# Patient Record
Sex: MALE | URBAN - METROPOLITAN AREA
[De-identification: names, ages, dates, MRNs, and addresses within clinical notes are randomized per-mention and may not be internally consistent; named-entity substitution may affect disease eponyms.]

---

## 2021-09-24 ENCOUNTER — OUT OF OFFICE VISIT (OUTPATIENT)
Dept: URBAN - METROPOLITAN AREA MEDICAL CENTER 33 | Facility: MEDICAL CENTER | Age: 26
End: 2021-09-24
Payer: COMMERCIAL

## 2021-09-24 DIAGNOSIS — R11.0 AM NAUSEA: ICD-10-CM

## 2021-09-24 DIAGNOSIS — R10.84 ABDOMINAL CRAMPING, GENERALIZED: ICD-10-CM

## 2021-09-24 DIAGNOSIS — D64.89 ANEMIA DUE TO OTHER CAUSE: ICD-10-CM

## 2021-09-24 DIAGNOSIS — K50.90 ABDOMINAL PAIN DESPITE THERAPY FOR CROHN'S DISEASE: ICD-10-CM

## 2021-09-24 PROCEDURE — 99255 IP/OBS CONSLTJ NEW/EST HI 80: CPT | Performed by: INTERNAL MEDICINE

## 2021-09-25 ENCOUNTER — OUT OF OFFICE VISIT (OUTPATIENT)
Dept: URBAN - METROPOLITAN AREA MEDICAL CENTER 33 | Facility: MEDICAL CENTER | Age: 26
End: 2021-09-25
Payer: COMMERCIAL

## 2021-09-25 DIAGNOSIS — R10.84 ABDOMINAL CRAMPING, GENERALIZED: ICD-10-CM

## 2021-09-25 DIAGNOSIS — K50.918 ACUTE CROHN'S DISEASE WITH OTHER COMPLICATION: ICD-10-CM

## 2021-09-25 PROCEDURE — 99232 SBSQ HOSP IP/OBS MODERATE 35: CPT | Performed by: INTERNAL MEDICINE

## 2021-09-27 ENCOUNTER — OUT OF OFFICE VISIT (OUTPATIENT)
Dept: URBAN - METROPOLITAN AREA MEDICAL CENTER 33 | Facility: MEDICAL CENTER | Age: 26
End: 2021-09-27
Payer: COMMERCIAL

## 2021-09-27 DIAGNOSIS — R11.0 AM NAUSEA: ICD-10-CM

## 2021-09-27 DIAGNOSIS — D64.89 ANEMIA DUE TO OTHER CAUSE: ICD-10-CM

## 2021-09-27 DIAGNOSIS — K50.10 CC (CROHN'S COLITIS): ICD-10-CM

## 2021-09-27 DIAGNOSIS — R10.84 ABDOMINAL CRAMPING, GENERALIZED: ICD-10-CM

## 2021-09-27 PROCEDURE — 99232 SBSQ HOSP IP/OBS MODERATE 35: CPT | Performed by: INTERNAL MEDICINE

## 2021-10-12 ENCOUNTER — OFFICE VISIT (OUTPATIENT)
Dept: URBAN - METROPOLITAN AREA CLINIC 17 | Facility: CLINIC | Age: 26
End: 2021-10-12
Payer: COMMERCIAL

## 2021-10-12 VITALS
TEMPERATURE: 97.4 F | DIASTOLIC BLOOD PRESSURE: 68 MMHG | HEART RATE: 80 BPM | WEIGHT: 190.2 LBS | BODY MASS INDEX: 22.46 KG/M2 | HEIGHT: 77 IN | SYSTOLIC BLOOD PRESSURE: 108 MMHG

## 2021-10-12 DIAGNOSIS — K56.609 SBO (SMALL BOWEL OBSTRUCTION): ICD-10-CM

## 2021-10-12 DIAGNOSIS — F12.90 MARIJUANA USE: ICD-10-CM

## 2021-10-12 DIAGNOSIS — K50.812 CROHN'S DISEASE OF BOTH SMALL AND LARGE INTESTINE WITH INTESTINAL OBSTRUCTION: ICD-10-CM

## 2021-10-12 PROCEDURE — 99214 OFFICE O/P EST MOD 30 MIN: CPT | Performed by: INTERNAL MEDICINE

## 2021-10-12 RX ORDER — PREDNISONE 20 MG/1
1 TABLET TABLET ORAL ONCE A DAY
Status: ACTIVE | COMMUNITY

## 2021-10-12 NOTE — HPI-TODAY'S VISIT:
10/12/21  25 yo male I met at [facility] last month for flare of Crohns disease.   Richard Solano is a 26 y.o. male on whom I have been asked to consult for evaluation and treatment of crohn's flare. PMH of Crohn's disease diagnosed on 2017 in [place]. Present to [facility] with abdominal pain, nausea and decreased ability to have a BM. Labs fairly normal but CT with wall thickening and enhancement in the terminal ileum and cecum likely causing an SBO. Inflammation and edema in the surrounding mesentery.   Recently moved to [place] about 2 month ago and is currently staying with his sister. He report he was diagnosed 4 years ago in  [place] with crohn's disease. He reports seeing a GI doctor there that was going to start him on a biologic but when the risk of CA was discussed he became worried. He reports at least 1-2 flares per month that he has tried to treate at home with diet modification or high diose cannabinoid oil. He report his symptoms usually present wtih RLQ/ low abdominal abdominal pain that is crampy and constant. He will usually stop having BMs and then become nauseated and vomit a couple time. If this does not improve after a couple days he will usually see a MD (urgent care) and will be placed on a steroid taper.  He was on a steroid taper when his symptoms recurred about 3 months ago. He was given Prednisone 40mg and weaned to 30mg last week. On Tuesday he started to have abdominal pain and vomited once. His pain increased significantly Wednesday with bloating and " bulging" and he vomited 2x so he came to the ED. He reports he currently does not have health insurance but has submitted a medicaid application. He also notes a weight loss of about 100lbs since he was first diagnosed. His mother has RA and his sister was recently diagnosed with Crohns disease.   10/12/21 "Ruma been doing really good since getting out of the hospital. He has gone from 40 mg of prednisone to 30 mg of prednisone today,  No vomiting, mild nausea "not that much", No abdominal pain. Has regular BMs. no records from [place]  He has submitted a medicaid application.

## 2021-11-03 ENCOUNTER — OUT OF OFFICE VISIT (OUTPATIENT)
Dept: URBAN - METROPOLITAN AREA MEDICAL CENTER 33 | Facility: MEDICAL CENTER | Age: 26
End: 2021-11-03
Payer: COMMERCIAL

## 2021-11-03 DIAGNOSIS — R19.7 ACUTE DIARRHEA: ICD-10-CM

## 2021-11-03 DIAGNOSIS — K56.7 ILEUS: ICD-10-CM

## 2021-11-03 DIAGNOSIS — K50.918 ACUTE CROHN'S DISEASE WITH OTHER COMPLICATION: ICD-10-CM

## 2021-11-03 PROCEDURE — 99255 IP/OBS CONSLTJ NEW/EST HI 80: CPT | Performed by: INTERNAL MEDICINE

## 2021-11-04 ENCOUNTER — OUT OF OFFICE VISIT (OUTPATIENT)
Dept: URBAN - METROPOLITAN AREA MEDICAL CENTER 33 | Facility: MEDICAL CENTER | Age: 26
End: 2021-11-04
Payer: COMMERCIAL

## 2021-11-04 DIAGNOSIS — R19.7 ACUTE DIARRHEA: ICD-10-CM

## 2021-11-04 DIAGNOSIS — K50.90 ABDOMINAL PAIN DESPITE THERAPY FOR CROHN'S DISEASE: ICD-10-CM

## 2021-11-04 PROCEDURE — 99232 SBSQ HOSP IP/OBS MODERATE 35: CPT | Performed by: INTERNAL MEDICINE

## 2021-11-16 ENCOUNTER — OFFICE VISIT (OUTPATIENT)
Dept: URBAN - METROPOLITAN AREA CLINIC 17 | Facility: CLINIC | Age: 26
End: 2021-11-16
Payer: COMMERCIAL

## 2021-11-16 VITALS
TEMPERATURE: 97.4 F | WEIGHT: 198 LBS | BODY MASS INDEX: 23.38 KG/M2 | HEART RATE: 86 BPM | DIASTOLIC BLOOD PRESSURE: 68 MMHG | SYSTOLIC BLOOD PRESSURE: 109 MMHG | HEIGHT: 77 IN

## 2021-11-16 DIAGNOSIS — K56.690 OTHER PARTIAL INTESTINAL OBSTRUCTION: ICD-10-CM

## 2021-11-16 DIAGNOSIS — D64.89 ANEMIA DUE TO OTHER CAUSE: ICD-10-CM

## 2021-11-16 DIAGNOSIS — K50.812 CROHN'S DISEASE OF BOTH SMALL AND LARGE INTESTINE WITH INTESTINAL OBSTRUCTION: ICD-10-CM

## 2021-11-16 DIAGNOSIS — F12.90 MARIJUANA USE: ICD-10-CM

## 2021-11-16 PROCEDURE — 99214 OFFICE O/P EST MOD 30 MIN: CPT | Performed by: INTERNAL MEDICINE

## 2021-11-16 RX ORDER — OMEPRAZOLE 40 MG/1
1 CAPSULE 30 MINUTES BEFORE MORNING MEAL CAPSULE, DELAYED RELEASE ORAL ONCE A DAY
Status: ACTIVE | COMMUNITY

## 2021-11-16 RX ORDER — PREDNISONE 20 MG/1
2 TABLETS WITH FOOD OR MILK TABLET ORAL ONCE A DAY
Qty: 60 TABLET | OUTPATIENT
Start: 2021-11-16 | End: 2021-12-16

## 2021-11-16 RX ORDER — PREDNISONE 20 MG/1
1 TABLET TABLET ORAL ONCE A DAY
Status: ACTIVE | COMMUNITY

## 2021-11-16 NOTE — HPI-TODAY'S VISIT:
10/12/21  27 yo male I met at [facility] last month for flare of Crohns disease.   Richard Solano is a 26 y.o. male on whom I have been asked to consult for evaluation and treatment of crohn's flare. PMH of Crohn's disease diagnosed on 2017 in [place]. Present to [facility] with abdominal pain, nausea and decreased ability to have a BM. Labs fairly normal but CT with wall thickening and enhancement in the terminal ileum and cecum likely causing an SBO. Inflammation and edema in the surrounding mesentery.   Recently moved to [place] about 2 month ago and is currently staying with his sister. He report he was diagnosed 4 years ago in  [place] with crohn's disease. He reports seeing a GI doctor there that was going to start him on a biologic but when the risk of CA was discussed he became worried. He reports at least 1-2 flares per month that he has tried to treate at home with diet modification or high diose cannabinoid oil. He report his symptoms usually present wtih RLQ/ low abdominal abdominal pain that is crampy and constant. He will usually stop having BMs and then become nauseated and vomit a couple time. If this does not improve after a couple days he will usually see a MD (urgent care) and will be placed on a steroid taper.  He was on a steroid taper when his symptoms recurred about 3 months ago. He was given Prednisone 40mg and weaned to 30mg last week. On Tuesday he started to have abdominal pain and vomited once. His pain increased significantly Wednesday with bloating and " bulging" and he vomited 2x so he came to the ED. He reports he currently does not have health insurance but has submitted a medicaid application. He also notes a weight loss of about 100lbs since he was first diagnosed. His mother has RA and his sister was recently diagnosed with Crohns disease.   10/12/21 "Ruma been doing really good since getting out of the hospital. He has gone from 40 mg of prednisone to 30 mg of prednisone today,  No vomiting, mild nausea "not that much", No abdominal pain. Has regular BMs. no records from [place]  He has submitted a medicaid application.   11/16/21 Here for post  hospital visit. Admitted on 11/2 -11/4 at [facility]. He thinks he had gone down to 30 mg of Prednisone before admission.   He was treated with solumedrol for 24 hours then discharged on Prednisone 40 mg.  GI consult note as follows :   Pt presents to Ed 11/2 with chills, N/V and abdominal pain since Monday. COVID and Flu negative. Unable to keep down oral steroids due to N/V. Reports symptoms started suddenly after eating Debbie Monday night. He notes increased pressure to RLQ and on Tuesday > 10 episodes of watery, brown with multiple episode of N/V, usually occurring at the same time. Significant myalgias also noted.  Here labs with WBC 16.3, LA 3.3, thrombocytosis and Cr 1.5. IVF started. CT with SB and cecal wall thickening, likely SBO. Pt reports he was denied for medicaid but is now obtaining insurance from [employer]. Improved today and tolerating CLD. 5 small episodes of diarrhea. No blood or melena. "   CT abd pelvis showed "redemonstration of moderately prominent inflammatory changes at right lower quadrant most likely related to Crohn's disease, stable to slightly less prominent. No loculated fluid collection. Moderately prominent fluid filled and distended small bowel loops representing bowel obstruction however distention of bowel loops is also slightly less prominent compared to previous exam."  Labs wnl except Hb 10.5.  He is feeling well. Now on 40 mg of Prednisone and Omeprazole 40 mg daily. Has regular BMs. no blood in stool. BM is formed about twice day.  I spent several mins explaining s/e of steroids.  Ideally I would like to put him on a steroid sparing agent but cannot do that w/o tissue confirmation. He did not provide us with the name of his prior provider but states he has recently reqquested them. I told him to fill out a records request so we can try to retrieve them. IN addition in the abscence of records, he needs an EGD/colonoscopy but is currently uninsurred and cannot pay for. I have explained that I am happy to give a 30 day supply of steroids but perhaps he would be better served at [place] since he is a [facility] resident.

## 2021-11-18 LAB
HBSAG SCREEN: NEGATIVE
HBV IU/ML: (no result)
HEP B CORE AB, IGM: NEGATIVE
HEP BE AB: NEGATIVE
HEP BE AG: NEGATIVE
HEPATITIS B SURF AB QUANT: 5.4
LOG10 HBV IU/ML: (no result)
TEST INFORMATION:: (no result)

## 2021-11-19 LAB
Lab: (no result)
Lab: (no result)

## 2021-11-23 LAB
HEP B CORE AB, TOT: NEGATIVE
WRITTEN AUTHORIZATION: (no result)

## 2021-12-08 ENCOUNTER — OFFICE VISIT (OUTPATIENT)
Dept: URBAN - METROPOLITAN AREA CLINIC 105 | Facility: CLINIC | Age: 26
End: 2021-12-08
Payer: COMMERCIAL

## 2021-12-08 VITALS
DIASTOLIC BLOOD PRESSURE: 71 MMHG | WEIGHT: 200.2 LBS | TEMPERATURE: 97.8 F | HEART RATE: 76 BPM | BODY MASS INDEX: 23.64 KG/M2 | HEIGHT: 77 IN | SYSTOLIC BLOOD PRESSURE: 123 MMHG

## 2021-12-08 DIAGNOSIS — K56.609 SBO (SMALL BOWEL OBSTRUCTION): ICD-10-CM

## 2021-12-08 DIAGNOSIS — D50.8 ACQUIRED IRON DEFICIENCY ANEMIA DUE TO DECREASED ABSORPTION: ICD-10-CM

## 2021-12-08 DIAGNOSIS — K50.812 CROHN'S DISEASE OF BOTH SMALL AND LARGE INTESTINE WITH INTESTINAL OBSTRUCTION: ICD-10-CM

## 2021-12-08 DIAGNOSIS — F12.90 MARIJUANA USE: ICD-10-CM

## 2021-12-08 PROCEDURE — 99214 OFFICE O/P EST MOD 30 MIN: CPT | Performed by: INTERNAL MEDICINE

## 2021-12-08 RX ORDER — PREDNISONE 20 MG/1
1 TABLET TABLET ORAL ONCE A DAY
Status: ACTIVE | COMMUNITY

## 2021-12-08 RX ORDER — PREDNISONE 20 MG/1
2 TABLETS WITH FOOD OR MILK TABLET ORAL ONCE A DAY
Qty: 60 TABLET | Status: ACTIVE | COMMUNITY
Start: 2021-11-16 | End: 2021-12-16

## 2021-12-08 RX ORDER — OMEPRAZOLE 40 MG/1
1 CAPSULE 30 MINUTES BEFORE MORNING MEAL CAPSULE, DELAYED RELEASE ORAL ONCE A DAY
Status: DISCONTINUED | COMMUNITY

## 2021-12-08 RX ORDER — PREDNISONE 20 MG/1
2 TABLETS WITH FOOD OR MILK TABLET ORAL ONCE A DAY
Qty: 60 TABLET | OUTPATIENT

## 2021-12-08 NOTE — HPI-TODAY'S VISIT:
10/12/21  27 yo male I met at [facility] last month for flare of Crohns disease.   Richard Solano is a 26 y.o. male on whom I have been asked to consult for evaluation and treatment of crohn's flare. PMH of Crohn's disease diagnosed on 2017 in [place]. Present to [facility] with abdominal pain, nausea and decreased ability to have a BM. Labs fairly normal but CT with wall thickening and enhancement in the terminal ileum and cecum likely causing an SBO. Inflammation and edema in the surrounding mesentery.   Recently moved to [place] about 2 month ago and is currently staying with his sister. He report he was diagnosed 4 years ago in  [place] with crohn's disease. He reports seeing a GI doctor there that was going to start him on a biologic but when the risk of CA was discussed he became worried. He reports at least 1-2 flares per month that he has tried to treate at home with diet modification or high diose cannabinoid oil. He report his symptoms usually present wtih RLQ/ low abdominal abdominal pain that is crampy and constant. He will usually stop having BMs and then become nauseated and vomit a couple time. If this does not improve after a couple days he will usually see a MD (urgent care) and will be placed on a steroid taper.  He was on a steroid taper when his symptoms recurred about 3 months ago. He was given Prednisone 40mg and weaned to 30mg last week. On Tuesday he started to have abdominal pain and vomited once. His pain increased significantly Wednesday with bloating and " bulging" and he vomited 2x so he came to the ED. He reports he currently does not have health insurance but has submitted a medicaid application. He also notes a weight loss of about 100lbs since he was first diagnosed. His mother has RA and his sister was recently diagnosed with Crohns disease.   10/12/21 "Ruma been doing really good since getting out of the hospital. He has gone from 40 mg of prednisone to 30 mg of prednisone today,  No vomiting, mild nausea "not that much", No abdominal pain. Has regular BMs. no records from [place]  He has submitted a medicaid application.   11/16/21 Here for post  hospital visit. Admitted on 11/2 -11/4 at [facility]. He thinks he had gone down to 30 mg of Prednisone before admission.   He was treated with solumedrol for 24 hours then discharged on Prednisone 40 mg.  GI consult note as follows :   Pt presents to Ed 11/2 with chills, N/V and abdominal pain since Monday. COVID and Flu negative. Unable to keep down oral steroids due to N/V. Reports symptoms started suddenly after eating Debbie Monday night. He notes increased pressure to RLQ and on Tuesday > 10 episodes of watery, brown with multiple episode of N/V, usually occurring at the same time. Significant myalgias also noted.  Here labs with WBC 16.3, LA 3.3, thrombocytosis and Cr 1.5. IVF started. CT with SB and cecal wall thickening, likely SBO. Pt reports he was denied for medicaid but is now obtaining insurance from [employer]. Improved today and tolerating CLD. 5 small episodes of diarrhea. No blood or melena. "   CT abd pelvis showed "redemonstration of moderately prominent inflammatory changes at right lower quadrant most likely related to Crohn's disease, stable to slightly less prominent. No loculated fluid collection. Moderately prominent fluid filled and distended small bowel loops representing bowel obstruction however distention of bowel loops is also slightly less prominent compared to previous exam."  Labs wnl except Hb 10.5.  He is feeling well. Now on 40 mg of Prednisone and Omeprazole 40 mg daily. Has regular BMs. no blood in stool. BM is formed about twice day.  I spent several mins explaining s/e of steroids.  Ideally I would like to put him on a steroid sparing agent but cannot do that w/o tissue confirmation. He did not provide us with the name of his prior provider but states he has recently reqquested them. I told him to fill out a records request so we can try to retrieve them. IN addition in the abscence of records, he needs an EGD/colonoscopy but is currently uninsurred and cannot pay for. I have explained that I am happy to give a 30 day supply of steroids but perhaps he would be better served at [facility] since he is a [facility] resident.   12/8/21 Here for f.u visit Spent over 30 mins reviewing records.  Bx from 2018 colonoscopy show cecal bx with acute and chronic inflammation. Currently on prednisone 40 mg daily BMs are "pretty good". Regular, formed, minor straining every now and then. no blood in stool.

## 2021-12-31 LAB
QUANTIFERON CRITERIA: (no result)
QUANTIFERON INCUBATION: (no result)
QUANTIFERON MITOGEN VALUE: >10
QUANTIFERON NIL VALUE: 0.01
QUANTIFERON TB1 AG VALUE: 0.02
QUANTIFERON TB2 AG VALUE: 0.01
QUANTIFERON-TB GOLD PLUS: NEGATIVE

## 2022-01-05 ENCOUNTER — TELEPHONE ENCOUNTER (OUTPATIENT)
Dept: URBAN - METROPOLITAN AREA CLINIC 17 | Facility: CLINIC | Age: 27
End: 2022-01-05

## 2022-01-05 ENCOUNTER — LAB OUTSIDE AN ENCOUNTER (OUTPATIENT)
Dept: URBAN - METROPOLITAN AREA CLINIC 17 | Facility: CLINIC | Age: 27
End: 2022-01-05

## 2022-01-05 PROBLEM — 71833008: Status: ACTIVE | Noted: 2022-01-05

## 2022-01-05 RX ORDER — PREDNISONE 20 MG/1
2 TABLETS WITH FOOD OR MILK TABLET ORAL ONCE A DAY
Qty: 180 | Refills: 0
End: 2022-04-05

## 2022-01-10 ENCOUNTER — TELEPHONE ENCOUNTER (OUTPATIENT)
Dept: URBAN - METROPOLITAN AREA CLINIC 92 | Facility: CLINIC | Age: 27
End: 2022-01-10

## 2022-01-10 RX ORDER — HYDROCORTISONE SODIUM SUCCINATE 100 MG/2ML
AS DIRECTED INJECTION, POWDER, FOR SOLUTION INTRAMUSCULAR; INTRAVENOUS
Qty: 100 MILLIGRAM | Refills: 0 | OUTPATIENT
Start: 2022-01-10 | End: 2022-01-11

## 2022-01-10 RX ORDER — INFLIXIMAB 100 MG/10ML
AS DIRECTED INJECTION, POWDER, LYOPHILIZED, FOR SOLUTION INTRAVENOUS
Qty: 100 MILLIGRAMS | Refills: 0 | OUTPATIENT
Start: 2022-01-10 | End: 2022-02-09

## 2022-01-10 RX ORDER — DIPHENHYDRAMINE HCL 2 %
1 CAPSULE AT BEDTIME AS NEEDED CREAM (GRAM) TOPICAL ONCE A DAY
Qty: 30 | Refills: 0 | OUTPATIENT
Start: 2022-01-10 | End: 2022-02-09

## 2022-01-10 RX ORDER — ACETAMINOPHEN 650 MG
2 TABLETS AS NEEDED TABLET, EXTENDED RELEASE ORAL
Qty: 6 TABLET | Refills: 0 | OUTPATIENT
Start: 2022-01-10 | End: 2022-01-11

## 2022-01-11 ENCOUNTER — OFFICE VISIT (OUTPATIENT)
Dept: URBAN - METROPOLITAN AREA CLINIC 17 | Facility: CLINIC | Age: 27
End: 2022-01-11

## 2022-01-11 ENCOUNTER — WEB ENCOUNTER (OUTPATIENT)
Dept: URBAN - METROPOLITAN AREA CLINIC 17 | Facility: CLINIC | Age: 27
End: 2022-01-11

## 2022-01-11 RX ORDER — PREDNISONE 20 MG/1
2 TABLETS WITH FOOD OR MILK TABLET ORAL ONCE A DAY
Qty: 180 | Refills: 0 | Status: ACTIVE | COMMUNITY
End: 2022-04-05

## 2022-01-11 RX ORDER — ACETAMINOPHEN 650 MG
2 TABLETS AS NEEDED TABLET, EXTENDED RELEASE ORAL
Qty: 6 TABLET | Refills: 0 | Status: ACTIVE | COMMUNITY
Start: 2022-01-10 | End: 2022-01-11

## 2022-01-11 RX ORDER — PREDNISONE 20 MG/1
1 TABLET TABLET ORAL ONCE A DAY
Status: ACTIVE | COMMUNITY

## 2022-01-11 RX ORDER — INFLIXIMAB 100 MG/10ML
AS DIRECTED INJECTION, POWDER, LYOPHILIZED, FOR SOLUTION INTRAVENOUS
Qty: 100 MILLIGRAMS | Refills: 0 | Status: ACTIVE | COMMUNITY
Start: 2022-01-10 | End: 2022-02-09

## 2022-01-11 RX ORDER — DIPHENHYDRAMINE HCL 2 %
1 CAPSULE AT BEDTIME AS NEEDED CREAM (GRAM) TOPICAL ONCE A DAY
Qty: 30 | Refills: 0 | Status: ACTIVE | COMMUNITY
Start: 2022-01-10 | End: 2022-02-09

## 2022-01-11 RX ORDER — HYDROCORTISONE SODIUM SUCCINATE 100 MG/2ML
AS DIRECTED INJECTION, POWDER, FOR SOLUTION INTRAMUSCULAR; INTRAVENOUS
Qty: 100 MILLIGRAM | Refills: 0 | Status: ACTIVE | COMMUNITY
Start: 2022-01-10 | End: 2022-01-11

## 2022-02-11 ENCOUNTER — TELEPHONE ENCOUNTER (OUTPATIENT)
Dept: URBAN - METROPOLITAN AREA CLINIC 92 | Facility: CLINIC | Age: 27
End: 2022-02-11

## 2022-02-11 RX ORDER — HYDROCORTISONE SODIUM SUCCINATE 100 MG/2ML
AS DIRECTED INJECTION, POWDER, FOR SOLUTION INTRAMUSCULAR; INTRAVENOUS
Qty: 100 MILLIGRAM | Refills: 0 | OUTPATIENT
Start: 2022-02-11 | End: 2022-02-12

## 2022-02-11 RX ORDER — INFLIXIMAB 100 MG/10ML
AS DIRECTED INJECTION, POWDER, LYOPHILIZED, FOR SOLUTION INTRAVENOUS
Qty: 100 MILLIGRAMS | Refills: 0 | OUTPATIENT
Start: 2022-02-11 | End: 2022-03-13

## 2022-02-11 RX ORDER — DIPHENHYDRAMINE HCL 2 %
1 CAPSULE AT BEDTIME AS NEEDED CREAM (GRAM) TOPICAL ONCE A DAY
Qty: 30 | Refills: 0 | OUTPATIENT
Start: 2022-02-11 | End: 2022-03-13

## 2022-02-11 RX ORDER — ACETAMINOPHEN 650 MG
2 TABLETS AS NEEDED TABLET, EXTENDED RELEASE ORAL
Qty: 6 TABLET | Refills: 0 | OUTPATIENT
Start: 2022-02-11 | End: 2022-02-12

## 2022-02-14 ENCOUNTER — OFFICE VISIT (OUTPATIENT)
Dept: URBAN - METROPOLITAN AREA CLINIC 91 | Facility: CLINIC | Age: 27
End: 2022-02-14
Payer: COMMERCIAL

## 2022-02-14 VITALS
HEIGHT: 77 IN | SYSTOLIC BLOOD PRESSURE: 136 MMHG | RESPIRATION RATE: 18 BRPM | WEIGHT: 197.4 LBS | BODY MASS INDEX: 23.31 KG/M2 | HEART RATE: 99 BPM | TEMPERATURE: 98.7 F | DIASTOLIC BLOOD PRESSURE: 96 MMHG

## 2022-02-14 DIAGNOSIS — K50.80 CROHN'S COLITIS: ICD-10-CM

## 2022-02-14 PROCEDURE — 96415 CHEMO IV INFUSION ADDL HR: CPT | Performed by: INTERNAL MEDICINE

## 2022-02-14 PROCEDURE — 96413 CHEMO IV INFUSION 1 HR: CPT | Performed by: INTERNAL MEDICINE

## 2022-02-14 RX ORDER — PREDNISONE 20 MG/1
1 TABLET TABLET ORAL ONCE A DAY
Status: ACTIVE | COMMUNITY

## 2022-02-14 RX ORDER — INFLIXIMAB 100 MG/10ML
AS DIRECTED INJECTION, POWDER, LYOPHILIZED, FOR SOLUTION INTRAVENOUS
Qty: 100 MILLIGRAMS | Refills: 0 | Status: ACTIVE | COMMUNITY
Start: 2022-02-11 | End: 2022-03-13

## 2022-02-14 RX ORDER — PREDNISONE 20 MG/1
2 TABLETS WITH FOOD OR MILK TABLET ORAL ONCE A DAY
Qty: 180 | Refills: 0 | Status: ACTIVE | COMMUNITY
End: 2022-04-05

## 2022-02-14 RX ORDER — DIPHENHYDRAMINE HCL 2 %
1 CAPSULE AT BEDTIME AS NEEDED CREAM (GRAM) TOPICAL ONCE A DAY
Qty: 30 | Refills: 0 | Status: ACTIVE | COMMUNITY
Start: 2022-02-11 | End: 2022-03-13

## 2022-02-22 ENCOUNTER — TELEPHONE ENCOUNTER (OUTPATIENT)
Dept: URBAN - METROPOLITAN AREA CLINIC 23 | Facility: CLINIC | Age: 27
End: 2022-02-22

## 2022-02-28 ENCOUNTER — CLAIMS CREATED FROM THE CLAIM WINDOW (OUTPATIENT)
Dept: URBAN - METROPOLITAN AREA CLINIC 91 | Facility: CLINIC | Age: 27
End: 2022-02-28
Payer: COMMERCIAL

## 2022-02-28 ENCOUNTER — CLAIMS CREATED FROM THE CLAIM WINDOW (OUTPATIENT)
Dept: URBAN - METROPOLITAN AREA CLINIC 91 | Facility: CLINIC | Age: 27
End: 2022-02-28

## 2022-02-28 ENCOUNTER — OFFICE VISIT (OUTPATIENT)
Dept: URBAN - METROPOLITAN AREA CLINIC 91 | Facility: CLINIC | Age: 27
End: 2022-02-28

## 2022-02-28 VITALS
SYSTOLIC BLOOD PRESSURE: 152 MMHG | RESPIRATION RATE: 20 BRPM | BODY MASS INDEX: 24.04 KG/M2 | WEIGHT: 203.6 LBS | HEART RATE: 82 BPM | DIASTOLIC BLOOD PRESSURE: 77 MMHG | TEMPERATURE: 97.2 F | HEIGHT: 77 IN

## 2022-02-28 DIAGNOSIS — K50.80 CROHN'S COLITIS: ICD-10-CM

## 2022-02-28 PROCEDURE — 96413 CHEMO IV INFUSION 1 HR: CPT | Performed by: INTERNAL MEDICINE

## 2022-02-28 PROCEDURE — 96415 CHEMO IV INFUSION ADDL HR: CPT | Performed by: INTERNAL MEDICINE

## 2022-02-28 RX ORDER — PREDNISONE 20 MG/1
1 TABLET TABLET ORAL ONCE A DAY
Status: ACTIVE | COMMUNITY

## 2022-02-28 RX ORDER — PREDNISONE 20 MG/1
2 TABLETS WITH FOOD OR MILK TABLET ORAL ONCE A DAY
Qty: 180 | Refills: 0 | Status: ACTIVE | COMMUNITY
End: 2022-04-05

## 2022-02-28 RX ORDER — DIPHENHYDRAMINE HCL 2 %
1 CAPSULE AT BEDTIME AS NEEDED CREAM (GRAM) TOPICAL ONCE A DAY
Qty: 30 | Refills: 0 | Status: ACTIVE | COMMUNITY
Start: 2022-02-11 | End: 2022-03-13

## 2022-02-28 RX ORDER — INFLIXIMAB 100 MG/10ML
AS DIRECTED INJECTION, POWDER, LYOPHILIZED, FOR SOLUTION INTRAVENOUS
Qty: 100 MILLIGRAMS | Refills: 0 | Status: ACTIVE | COMMUNITY
Start: 2022-02-11 | End: 2022-03-13

## 2022-03-14 ENCOUNTER — TELEPHONE ENCOUNTER (OUTPATIENT)
Dept: URBAN - METROPOLITAN AREA CLINIC 124 | Facility: CLINIC | Age: 27
End: 2022-03-14

## 2022-03-28 ENCOUNTER — LAB OUTSIDE AN ENCOUNTER (OUTPATIENT)
Dept: URBAN - METROPOLITAN AREA CLINIC 105 | Facility: CLINIC | Age: 27
End: 2022-03-28

## 2022-03-28 ENCOUNTER — OFFICE VISIT (OUTPATIENT)
Dept: URBAN - METROPOLITAN AREA CLINIC 91 | Facility: CLINIC | Age: 27
End: 2022-03-28

## 2022-03-28 ENCOUNTER — OFFICE VISIT (OUTPATIENT)
Dept: URBAN - METROPOLITAN AREA CLINIC 105 | Facility: CLINIC | Age: 27
End: 2022-03-28
Payer: COMMERCIAL

## 2022-03-28 VITALS
TEMPERATURE: 97.1 F | BODY MASS INDEX: 23.97 KG/M2 | HEIGHT: 77 IN | SYSTOLIC BLOOD PRESSURE: 130 MMHG | WEIGHT: 203 LBS | HEART RATE: 81 BPM | DIASTOLIC BLOOD PRESSURE: 77 MMHG

## 2022-03-28 DIAGNOSIS — F12.10 MARIJUANA ABUSE: ICD-10-CM

## 2022-03-28 DIAGNOSIS — K50.811 CROHN'S DISEASE OF BOTH SMALL AND LARGE INTESTINE WITH RECTAL BLEEDING: ICD-10-CM

## 2022-03-28 DIAGNOSIS — D64.9 NORMOCYTIC ANEMIA: ICD-10-CM

## 2022-03-28 DIAGNOSIS — K56.609 SBO (SMALL BOWEL OBSTRUCTION): ICD-10-CM

## 2022-03-28 PROBLEM — 37344009: Status: ACTIVE | Noted: 2022-03-28

## 2022-03-28 PROCEDURE — 99214 OFFICE O/P EST MOD 30 MIN: CPT | Performed by: INTERNAL MEDICINE

## 2022-03-28 RX ORDER — POLYETHYLENE GLYCOL-3350, SODIUM CHLORIDE, POTASSIUM CHLORIDE AND SODIUM BICARBONATE 420; 11.2; 5.72; 1.48 G/438.4G; G/438.4G; G/438.4G; G/438.4G
AS DIRECTED POWDER, FOR SOLUTION ORAL ONCE
Qty: 1 KIT | Refills: 0 | OUTPATIENT
Start: 2022-03-28 | End: 2022-03-29

## 2022-03-28 RX ORDER — PREDNISONE 20 MG/1
2 TABLETS WITH FOOD OR MILK TABLET ORAL ONCE A DAY
Qty: 180 | Refills: 0 | Status: ACTIVE | COMMUNITY
End: 2022-04-05

## 2022-03-28 NOTE — HPI-TODAY'S VISIT:
10/12/21  27 yo male I met at [facility] last month for flare of Crohns disease.   Richard Solano is a 26 y.o. male on whom I have been asked to consult for evaluation and treatment of crohn's flare. PMH of Crohn's disease diagnosed on 2017 in [place]. Present to [facility] with abdominal pain, nausea and decreased ability to have a BM. Labs fairly normal but CT with wall thickening and enhancement in the terminal ileum and cecum likely causing an SBO. Inflammation and edema in the surrounding mesentery.   Recently moved to [place] about 2 month ago and is currently staying with his sister. He report he was diagnosed 4 years ago in  [place] with crohn's disease. He reports seeing a GI doctor there that was going to start him on a biologic but when the risk of CA was discussed he became worried. He reports at least 1-2 flares per month that he has tried to treate at home with diet modification or high diose cannabinoid oil. He report his symptoms usually present wtih RLQ/ low abdominal abdominal pain that is crampy and constant. He will usually stop having BMs and then become nauseated and vomit a couple time. If this does not improve after a couple days he will usually see a MD (urgent care) and will be placed on a steroid taper.  He was on a steroid taper when his symptoms recurred about 3 months ago. He was given Prednisone 40mg and weaned to 30mg last week. On Tuesday he started to have abdominal pain and vomited once. His pain increased significantly Wednesday with bloating and " bulging" and he vomited 2x so he came to the ED. He reports he currently does not have health insurance but has submitted a medicaid application. He also notes a weight loss of about 100lbs since he was first diagnosed. His mother has RA and his sister was recently diagnosed with Crohns disease.   10/12/21 "Ruma been doing really good since getting out of the hospital. He has gone from 40 mg of prednisone to 30 mg of prednisone today,  No vomiting, mild nausea "not that much", No abdominal pain. Has regular BMs. no records from [place]  He has submitted a medicaid application.   11/16/21 Here for post  hospital visit. Admitted on 11/2 -11/4 at [facility]. He thinks he had gone down to 30 mg of Prednisone before admission.   He was treated with solumedrol for 24 hours then discharged on Prednisone 40 mg.  GI consult note as follows :   Pt presents to Ed 11/2 with chills, N/V and abdominal pain since Monday. COVID and Flu negative. Unable to keep down oral steroids due to N/V. Reports symptoms started suddenly after eating Debbie Monday night. He notes increased pressure to RLQ and on Tuesday > 10 episodes of watery, brown with multiple episode of N/V, usually occurring at the same time. Significant myalgias also noted.  Here labs with WBC 16.3, LA 3.3, thrombocytosis and Cr 1.5. IVF started. CT with SB and cecal wall thickening, likely SBO. Pt reports he was denied for medicaid but is now obtaining insurance from [employer]. Improved today and tolerating CLD. 5 small episodes of diarrhea. No blood or melena. "   CT abd pelvis showed "redemonstration of moderately prominent inflammatory changes at right lower quadrant most likely related to Crohn's disease, stable to slightly less prominent. No loculated fluid collection. Moderately prominent fluid filled and distended small bowel loops representing bowel obstruction however distention of bowel loops is also slightly less prominent compared to previous exam."  Labs wnl except Hb 10.5.  He is feeling well. Now on 40 mg of Prednisone and Omeprazole 40 mg daily. Has regular BMs. no blood in stool. BM is formed about twice day.  I spent several mins explaining s/e of steroids.  Ideally I would like to put him on a steroid sparing agent but cannot do that w/o tissue confirmation. He did not provide us with the name of his prior provider but states he has recently reqquested them. I told him to fill out a records request so we can try to retrieve them. IN addition in the abscence of records, he needs an EGD/colonoscopy but is currently uninsurred and cannot pay for. I have explained that I am happy to give a 30 day supply of steroids but perhaps he would be better served at [facility] since he is a [facility] resident.   12/8/21 Here for f.u visit Spent over 30 mins reviewing records.  Bx from 2018 colonoscopy show cecal bx with acute and chronic inflammation. Currently on prednisone 40 mg daily BMs are "pretty good". Regular, formed, minor straining every now and then. no blood in stool.   3/28/22 Here for f/u visit Got first remicade infusion 2/14 then 2/18 and next is tomorrow. Still on 5 mg of Prednisone - has been weaning off. Has been on 5 mg for the past 2 weeks. Stools vary in consistency - about 3 times a day "with some struggling". Some  straining. NO blood in stool. No fevers or chills. Mild abdominal discomfort - before a BM.

## 2022-03-29 ENCOUNTER — OFFICE VISIT (OUTPATIENT)
Dept: URBAN - METROPOLITAN AREA CLINIC 97 | Facility: CLINIC | Age: 27
End: 2022-03-29
Payer: COMMERCIAL

## 2022-03-29 ENCOUNTER — TELEPHONE ENCOUNTER (OUTPATIENT)
Dept: URBAN - METROPOLITAN AREA CLINIC 18 | Facility: CLINIC | Age: 27
End: 2022-03-29

## 2022-03-29 DIAGNOSIS — K50.80 CROHN'S COLITIS: ICD-10-CM

## 2022-03-29 PROCEDURE — 96415 CHEMO IV INFUSION ADDL HR: CPT | Performed by: INTERNAL MEDICINE

## 2022-03-29 PROCEDURE — 96413 CHEMO IV INFUSION 1 HR: CPT | Performed by: INTERNAL MEDICINE

## 2022-03-29 RX ORDER — PREDNISONE 20 MG/1
2 TABLETS WITH FOOD OR MILK TABLET ORAL ONCE A DAY
Qty: 180 | Refills: 0 | Status: ACTIVE | COMMUNITY
End: 2022-04-05

## 2022-03-29 RX ORDER — POLYETHYLENE GLYCOL-3350, SODIUM CHLORIDE, POTASSIUM CHLORIDE AND SODIUM BICARBONATE 420; 11.2; 5.72; 1.48 G/438.4G; G/438.4G; G/438.4G; G/438.4G
AS DIRECTED POWDER, FOR SOLUTION ORAL ONCE
Qty: 1 KIT | Refills: 0 | Status: ACTIVE | COMMUNITY
Start: 2022-03-28 | End: 2022-03-29

## 2022-03-29 RX ORDER — ONDANSETRON HYDROCHLORIDE 4 MG/1
AS DIRECTED TABLET, FILM COATED ORAL ONCE
Qty: 4 MILLIGRAM | Refills: 0 | OUTPATIENT
Start: 2022-03-30 | End: 2022-03-31

## 2022-04-06 LAB
A/G RATIO: 1.5
ALBUMIN: 4.3
ALKALINE PHOSPHATASE: 70
ALT (SGPT): 15
ANTI-INFLIXIMAB ANTIBODY: <22
AST (SGOT): 17
BILIRUBIN, TOTAL: <0.2
BUN/CREATININE RATIO: 10
BUN: 11
C-REACTIVE PROTEIN, QUANT: 8
CALCIUM: 9.3
CARBON DIOXIDE, TOTAL: 24
CHLORIDE: 102
CREATININE: 1.06
EGFR: 99
GLOBULIN, TOTAL: 2.8
GLUCOSE: 79
HEMATOCRIT: 40
HEMOGLOBIN: 12.8
INFLIXIMAB DRUG LEVEL: 19
MCH: 27.5
MCHC: 32
MCV: 86
NRBC: (no result)
PLATELETS: 361
POTASSIUM: 4.3
PROTEIN, TOTAL: 7.1
RBC: 4.65
RDW: 14.4
SEDIMENTATION RATE-WESTERGREN: 11
SODIUM: 141
WBC: 10.4

## 2022-04-26 ENCOUNTER — TELEPHONE ENCOUNTER (OUTPATIENT)
Dept: URBAN - METROPOLITAN AREA CLINIC 105 | Facility: CLINIC | Age: 27
End: 2022-04-26

## 2022-05-18 ENCOUNTER — OFFICE VISIT (OUTPATIENT)
Dept: URBAN - METROPOLITAN AREA SURGERY CENTER 16 | Facility: SURGERY CENTER | Age: 27
End: 2022-05-18
Payer: COMMERCIAL

## 2022-05-18 DIAGNOSIS — K50.80 CROHN'S COLITIS: ICD-10-CM

## 2022-05-18 DIAGNOSIS — K29.60 ADENOPAPILLOMATOSIS GASTRICA: ICD-10-CM

## 2022-05-18 PROCEDURE — 43239 EGD BIOPSY SINGLE/MULTIPLE: CPT | Performed by: INTERNAL MEDICINE

## 2022-05-18 PROCEDURE — G8907 PT DOC NO EVENTS ON DISCHARG: HCPCS | Performed by: INTERNAL MEDICINE

## 2022-05-18 PROCEDURE — 45380 COLONOSCOPY AND BIOPSY: CPT | Performed by: INTERNAL MEDICINE

## 2022-05-24 ENCOUNTER — OFFICE VISIT (OUTPATIENT)
Dept: URBAN - METROPOLITAN AREA CLINIC 97 | Facility: CLINIC | Age: 27
End: 2022-05-24
Payer: COMMERCIAL

## 2022-05-24 ENCOUNTER — TELEPHONE ENCOUNTER (OUTPATIENT)
Dept: URBAN - METROPOLITAN AREA CLINIC 105 | Facility: CLINIC | Age: 27
End: 2022-05-24

## 2022-05-24 DIAGNOSIS — K50.80 CROHN'S COLITIS: ICD-10-CM

## 2022-05-24 PROCEDURE — 96415 CHEMO IV INFUSION ADDL HR: CPT | Performed by: INTERNAL MEDICINE

## 2022-05-24 PROCEDURE — 96413 CHEMO IV INFUSION 1 HR: CPT | Performed by: INTERNAL MEDICINE

## 2022-05-25 ENCOUNTER — LAB OUTSIDE AN ENCOUNTER (OUTPATIENT)
Dept: URBAN - METROPOLITAN AREA CLINIC 105 | Facility: CLINIC | Age: 27
End: 2022-05-25

## 2022-05-31 LAB
ANTI-INFLIXIMAB ANTIBODY: <22
INFLIXIMAB DRUG LEVEL: 4

## 2022-07-18 PROBLEM — 71833008 CROHN'S DISEASE OF SMALL AND LARGE INTESTINES: Status: ACTIVE | Noted: 2022-07-18

## 2022-07-25 ENCOUNTER — TELEPHONE ENCOUNTER (OUTPATIENT)
Dept: URBAN - METROPOLITAN AREA CLINIC 97 | Facility: CLINIC | Age: 27
End: 2022-07-25

## 2022-07-25 RX ORDER — ACETAMINOPHEN 650 MG
2 TABLETS AS NEEDED TABLET, EXTENDED RELEASE ORAL
Qty: 6 TABLET | Refills: 0 | OUTPATIENT
Start: 2022-07-26 | End: 2022-07-27

## 2022-07-25 RX ORDER — DIPHENHYDRAMINE HCL 2 %
1 CAPSULE AT BEDTIME AS NEEDED CREAM (GRAM) TOPICAL ONCE A DAY
Qty: 30 | Refills: 0 | OUTPATIENT
Start: 2022-07-26 | End: 2022-08-25

## 2022-07-25 RX ORDER — HYDROCORTISONE SODIUM SUCCINATE 100 MG/2ML
AS DIRECTED INJECTION, POWDER, FOR SOLUTION INTRAMUSCULAR; INTRAVENOUS
Qty: 100 MILLIGRAM | Refills: 0 | OUTPATIENT
Start: 2022-07-26 | End: 2022-07-27

## 2022-07-25 RX ORDER — INFLIXIMAB 100 MG/10ML
AS DIRECTED INJECTION, POWDER, LYOPHILIZED, FOR SOLUTION INTRAVENOUS
Qty: 100 MILLIGRAMS | Refills: 0 | OUTPATIENT
Start: 2022-07-26 | End: 2022-08-25

## 2022-07-27 ENCOUNTER — TELEPHONE ENCOUNTER (OUTPATIENT)
Dept: URBAN - METROPOLITAN AREA CLINIC 92 | Facility: CLINIC | Age: 27
End: 2022-07-27

## 2022-07-27 RX ORDER — INFLIXIMAB 100 MG/10ML
AS DIRECTED INJECTION, POWDER, LYOPHILIZED, FOR SOLUTION INTRAVENOUS
Qty: 100 MILLIGRAMS | Refills: 0 | OUTPATIENT
Start: 2022-07-27 | End: 2022-08-26

## 2022-07-27 RX ORDER — ACETAMINOPHEN 650 MG
2 TABLETS AS NEEDED TABLET, EXTENDED RELEASE ORAL
Qty: 6 TABLET | Refills: 0 | OUTPATIENT
Start: 2022-07-27 | End: 2022-07-28

## 2022-07-27 RX ORDER — DIPHENHYDRAMINE HCL 2 %
1 CAPSULE AT BEDTIME AS NEEDED CREAM (GRAM) TOPICAL ONCE A DAY
Qty: 30 | Refills: 0 | OUTPATIENT
Start: 2022-07-27 | End: 2022-08-26

## 2022-07-27 RX ORDER — HYDROCORTISONE SODIUM SUCCINATE 100 MG/2ML
AS DIRECTED INJECTION, POWDER, FOR SOLUTION INTRAMUSCULAR; INTRAVENOUS
Qty: 100 MILLIGRAM | Refills: 0 | OUTPATIENT
Start: 2022-07-27 | End: 2022-07-28

## 2022-07-29 ENCOUNTER — OFFICE VISIT (OUTPATIENT)
Dept: URBAN - METROPOLITAN AREA CLINIC 105 | Facility: CLINIC | Age: 27
End: 2022-07-29

## 2022-08-01 ENCOUNTER — TELEPHONE ENCOUNTER (OUTPATIENT)
Dept: URBAN - METROPOLITAN AREA CLINIC 97 | Facility: CLINIC | Age: 27
End: 2022-08-01

## 2022-08-01 ENCOUNTER — TELEPHONE ENCOUNTER (OUTPATIENT)
Dept: URBAN - METROPOLITAN AREA CLINIC 105 | Facility: CLINIC | Age: 27
End: 2022-08-01

## 2022-08-04 ENCOUNTER — TELEPHONE ENCOUNTER (OUTPATIENT)
Dept: URBAN - METROPOLITAN AREA CLINIC 96 | Facility: CLINIC | Age: 27
End: 2022-08-04

## 2022-08-11 ENCOUNTER — OFFICE VISIT (OUTPATIENT)
Dept: URBAN - METROPOLITAN AREA CLINIC 97 | Facility: CLINIC | Age: 27
End: 2022-08-11
Payer: COMMERCIAL

## 2022-08-11 VITALS
SYSTOLIC BLOOD PRESSURE: 116 MMHG | HEIGHT: 77 IN | DIASTOLIC BLOOD PRESSURE: 70 MMHG | RESPIRATION RATE: 18 BRPM | BODY MASS INDEX: 21.96 KG/M2 | WEIGHT: 186 LBS | TEMPERATURE: 96.2 F | HEART RATE: 82 BPM

## 2022-08-11 DIAGNOSIS — K50.80 CROHN'S COLITIS: ICD-10-CM

## 2022-08-11 PROCEDURE — 96413 CHEMO IV INFUSION 1 HR: CPT | Performed by: INTERNAL MEDICINE

## 2022-08-11 PROCEDURE — 96415 CHEMO IV INFUSION ADDL HR: CPT | Performed by: INTERNAL MEDICINE

## 2022-08-11 RX ORDER — INFLIXIMAB 100 MG/10ML
AS DIRECTED INJECTION, POWDER, LYOPHILIZED, FOR SOLUTION INTRAVENOUS
Qty: 100 MILLIGRAMS | Refills: 0 | Status: ACTIVE | COMMUNITY
Start: 2022-07-26 | End: 2022-08-25

## 2022-08-11 RX ORDER — INFLIXIMAB 100 MG/10ML
AS DIRECTED INJECTION, POWDER, LYOPHILIZED, FOR SOLUTION INTRAVENOUS
Qty: 100 MILLIGRAMS | Refills: 0 | Status: ACTIVE | COMMUNITY
Start: 2022-07-27 | End: 2022-08-26

## 2022-08-11 RX ORDER — DIPHENHYDRAMINE HCL 2 %
1 CAPSULE AT BEDTIME AS NEEDED CREAM (GRAM) TOPICAL ONCE A DAY
Qty: 30 | Refills: 0 | Status: ACTIVE | COMMUNITY
Start: 2022-07-26 | End: 2022-08-25

## 2022-08-11 RX ORDER — DIPHENHYDRAMINE HCL 2 %
1 CAPSULE AT BEDTIME AS NEEDED CREAM (GRAM) TOPICAL ONCE A DAY
Qty: 30 | Refills: 0 | Status: ACTIVE | COMMUNITY
Start: 2022-07-27 | End: 2022-08-26

## 2022-08-12 ENCOUNTER — OFFICE VISIT (OUTPATIENT)
Dept: URBAN - METROPOLITAN AREA CLINIC 105 | Facility: CLINIC | Age: 27
End: 2022-08-12

## 2022-08-26 ENCOUNTER — TELEPHONE ENCOUNTER (OUTPATIENT)
Dept: URBAN - METROPOLITAN AREA CLINIC 105 | Facility: CLINIC | Age: 27
End: 2022-08-26

## 2022-09-02 ENCOUNTER — OFFICE VISIT (OUTPATIENT)
Dept: URBAN - METROPOLITAN AREA CLINIC 105 | Facility: CLINIC | Age: 27
End: 2022-09-02

## 2022-09-07 PROBLEM — 71833008: Status: ACTIVE | Noted: 2022-01-10

## 2022-09-11 ENCOUNTER — TELEPHONE ENCOUNTER (OUTPATIENT)
Dept: URBAN - METROPOLITAN AREA CLINIC 97 | Facility: CLINIC | Age: 27
End: 2022-09-11

## 2022-09-16 ENCOUNTER — OFFICE VISIT (OUTPATIENT)
Dept: URBAN - METROPOLITAN AREA CLINIC 105 | Facility: CLINIC | Age: 27
End: 2022-09-16

## 2022-09-29 ENCOUNTER — OFFICE VISIT (OUTPATIENT)
Dept: URBAN - METROPOLITAN AREA CLINIC 105 | Facility: CLINIC | Age: 27
End: 2022-09-29
Payer: COMMERCIAL

## 2022-09-29 VITALS
SYSTOLIC BLOOD PRESSURE: 130 MMHG | BODY MASS INDEX: 22.91 KG/M2 | TEMPERATURE: 97 F | DIASTOLIC BLOOD PRESSURE: 80 MMHG | WEIGHT: 194 LBS | HEIGHT: 77 IN | HEART RATE: 73 BPM

## 2022-09-29 DIAGNOSIS — D64.9 NORMOCYTIC ANEMIA: ICD-10-CM

## 2022-09-29 DIAGNOSIS — K56.609 SBO (SMALL BOWEL OBSTRUCTION): ICD-10-CM

## 2022-09-29 DIAGNOSIS — K50.812 CROHN'S DISEASE OF BOTH SMALL AND LARGE INTESTINE WITH INTESTINAL OBSTRUCTION: ICD-10-CM

## 2022-09-29 DIAGNOSIS — F12.90 MARIJUANA USE: ICD-10-CM

## 2022-09-29 PROBLEM — 71833008: Status: ACTIVE | Noted: 2021-10-12

## 2022-09-29 PROCEDURE — 99215 OFFICE O/P EST HI 40 MIN: CPT | Performed by: INTERNAL MEDICINE

## 2022-09-29 NOTE — HPI-TODAY'S VISIT:
10/12/21  27 yo male I met at [facility] last month for flare of Crohns disease.   Richard Solano is a 26 y.o. male on whom I have been asked to consult for evaluation and treatment of crohn's flare. PMH of Crohn's disease diagnosed on 2017 in [place]. Present to [facility] with abdominal pain, nausea and decreased ability to have a BM. Labs fairly normal but CT with wall thickening and enhancement in the terminal ileum and cecum likely causing an SBO. Inflammation and edema in the surrounding mesentery.   Recently moved to [place] about 2 month ago and is currently staying with his sister. He report he was diagnosed 4 years ago in  [place] with crohn's disease. He reports seeing a GI doctor there that was going to start him on a biologic but when the risk of CA was discussed he became worried. He reports at least 1-2 flares per month that he has tried to treate at home with diet modification or high diose cannabinoid oil. He report his symptoms usually present wtih RLQ/ low abdominal abdominal pain that is crampy and constant. He will usually stop having BMs and then become nauseated and vomit a couple time. If this does not improve after a couple days he will usually see a MD (urgent care) and will be placed on a steroid taper.  He was on a steroid taper when his symptoms recurred about 3 months ago. He was given Prednisone 40mg and weaned to 30mg last week. On Tuesday he started to have abdominal pain and vomited once. His pain increased significantly Wednesday with bloating and " bulging" and he vomited 2x so he came to the ED. He reports he currently does not have health insurance but has submitted a medicaid application. He also notes a weight loss of about 100lbs since he was first diagnosed. His mother has RA and his sister was recently diagnosed with Crohns disease.   10/12/21 "Ruma been doing really good since getting out of the hospital. He has gone from 40 mg of prednisone to 30 mg of prednisone today,  No vomiting, mild nausea "not that much", No abdominal pain. Has regular BMs. no records from [place]  He has submitted a medicaid application.   11/16/21 Here for post  hospital visit. Admitted on 11/2 -11/4 at [facility]. He thinks he had gone down to 30 mg of Prednisone before admission.   He was treated with solumedrol for 24 hours then discharged on Prednisone 40 mg.  GI consult note as follows :   Pt presents to Ed 11/2 with chills, N/V and abdominal pain since Monday. COVID and Flu negative. Unable to keep down oral steroids due to N/V. Reports symptoms started suddenly after eating Debbie Monday night. He notes increased pressure to RLQ and on Tuesday > 10 episodes of watery, brown with multiple episode of N/V, usually occurring at the same time. Significant myalgias also noted.  Here labs with WBC 16.3, LA 3.3, thrombocytosis and Cr 1.5. IVF started. CT with SB and cecal wall thickening, likely SBO. Pt reports he was denied for medicaid but is now obtaining insurance from [employer]. Improved today and tolerating CLD. 5 small episodes of diarrhea. No blood or melena. "   CT abd pelvis showed "redemonstration of moderately prominent inflammatory changes at right lower quadrant most likely related to Crohn's disease, stable to slightly less prominent. No loculated fluid collection. Moderately prominent fluid filled and distended small bowel loops representing bowel obstruction however distention of bowel loops is also slightly less prominent compared to previous exam."  Labs wnl except Hb 10.5.  He is feeling well. Now on 40 mg of Prednisone and Omeprazole 40 mg daily. Has regular BMs. no blood in stool. BM is formed about twice day.  I spent several mins explaining s/e of steroids.  Ideally I would like to put him on a steroid sparing agent but cannot do that w/o tissue confirmation. He did not provide us with the name of his prior provider but states he has recently reqquested them. I told him to fill out a records request so we can try to retrieve them. IN addition in the abscence of records, he needs an EGD/colonoscopy but is currently uninsurred and cannot pay for. I have explained that I am happy to give a 30 day supply of steroids but perhaps he would be better served at [facility] since he is a [facility] resident.   12/8/21 Here for f.u visit Spent over 30 mins reviewing records.  Bx from 2018 colonoscopy show cecal bx with acute and chronic inflammation. Currently on prednisone 40 mg daily BMs are "pretty good". Regular, formed, minor straining every now and then. no blood in stool.   3/28/22 Here for f/u visit Got first remicade infusion 2/14 then 2/18 and next is tomorrow. Still on 5 mg of Prednisone - has been weaning off. Has been on 5 mg for the past 2 weeks. Stools vary in consistency - about 3 times a day "with some struggling". Some  straining. NO blood in stool. No fevers or chills. Mild abdominal discomfort - before a BM.  9/29/22 Here for f.u visit Discussed colonoscopy and EGD and MRE Summary is there is a stricture with some active inflamation at the TI/cecal junction.  No abdominal pain. Some mild nausea. no vomiting.  BMs are daily.

## 2022-10-06 ENCOUNTER — OFFICE VISIT (OUTPATIENT)
Dept: URBAN - METROPOLITAN AREA CLINIC 97 | Facility: CLINIC | Age: 27
End: 2022-10-06

## 2022-10-12 ENCOUNTER — TELEPHONE ENCOUNTER (OUTPATIENT)
Dept: URBAN - METROPOLITAN AREA CLINIC 105 | Facility: CLINIC | Age: 27
End: 2022-10-12

## 2022-10-17 ENCOUNTER — TELEPHONE ENCOUNTER (OUTPATIENT)
Dept: URBAN - METROPOLITAN AREA CLINIC 97 | Facility: CLINIC | Age: 27
End: 2022-10-17

## 2022-10-17 ENCOUNTER — TELEPHONE ENCOUNTER (OUTPATIENT)
Dept: URBAN - METROPOLITAN AREA CLINIC 105 | Facility: CLINIC | Age: 27
End: 2022-10-17

## 2022-11-01 ENCOUNTER — TELEPHONE ENCOUNTER (OUTPATIENT)
Dept: URBAN - METROPOLITAN AREA CLINIC 105 | Facility: CLINIC | Age: 27
End: 2022-11-01

## 2022-11-09 ENCOUNTER — TELEPHONE ENCOUNTER (OUTPATIENT)
Dept: URBAN - METROPOLITAN AREA CLINIC 92 | Facility: CLINIC | Age: 27
End: 2022-11-09

## 2022-11-09 PROBLEM — 71833008: Status: ACTIVE | Noted: 2022-11-09

## 2022-11-09 RX ORDER — PREDNISONE 10 MG/1
1 TABLET TABLET ORAL ONCE A DAY
Qty: 30 TABLET | Refills: 0 | OUTPATIENT
Start: 2022-11-09 | End: 2022-12-09

## 2022-11-30 ENCOUNTER — DASHBOARD ENCOUNTERS (OUTPATIENT)
Age: 27
End: 2022-11-30

## 2022-12-01 ENCOUNTER — OFFICE VISIT (OUTPATIENT)
Dept: URBAN - METROPOLITAN AREA CLINIC 97 | Facility: CLINIC | Age: 27
End: 2022-12-01

## 2022-12-01 ENCOUNTER — OFFICE VISIT (OUTPATIENT)
Dept: URBAN - METROPOLITAN AREA CLINIC 17 | Facility: CLINIC | Age: 27
End: 2022-12-01

## 2022-12-01 VITALS — HEIGHT: 77 IN

## 2022-12-01 RX ORDER — PREDNISONE 10 MG/1
1 TABLET TABLET ORAL ONCE A DAY
Qty: 30 TABLET | Refills: 0 | COMMUNITY
Start: 2022-11-09 | End: 2022-12-09

## 2023-01-26 ENCOUNTER — OFFICE VISIT (OUTPATIENT)
Dept: URBAN - METROPOLITAN AREA CLINIC 97 | Facility: CLINIC | Age: 28
End: 2023-01-26

## 2023-02-01 ENCOUNTER — TELEPHONE ENCOUNTER (OUTPATIENT)
Dept: URBAN - METROPOLITAN AREA CLINIC 63 | Facility: CLINIC | Age: 28
End: 2023-02-01